# Patient Record
Sex: FEMALE | Race: WHITE | NOT HISPANIC OR LATINO | URBAN - METROPOLITAN AREA
[De-identification: names, ages, dates, MRNs, and addresses within clinical notes are randomized per-mention and may not be internally consistent; named-entity substitution may affect disease eponyms.]

---

## 2020-09-29 ENCOUNTER — LAB OUTSIDE AN ENCOUNTER (OUTPATIENT)
Dept: URBAN - METROPOLITAN AREA CLINIC 96 | Facility: CLINIC | Age: 25
End: 2020-09-29

## 2020-09-29 ENCOUNTER — WEB ENCOUNTER (OUTPATIENT)
Dept: URBAN - METROPOLITAN AREA CLINIC 96 | Facility: CLINIC | Age: 25
End: 2020-09-29

## 2020-09-29 ENCOUNTER — OFFICE VISIT (OUTPATIENT)
Dept: URBAN - METROPOLITAN AREA CLINIC 96 | Facility: CLINIC | Age: 25
End: 2020-09-29
Payer: COMMERCIAL

## 2020-09-29 DIAGNOSIS — K58.1 IRRITABLE BOWEL SYNDROME WITH CONSTIPATION: ICD-10-CM

## 2020-09-29 DIAGNOSIS — K21.0 GASTROESOPHAGEAL REFLUX DISEASE WITH ESOPHAGITIS: ICD-10-CM

## 2020-09-29 DIAGNOSIS — R14.0 BLOATING SYMPTOM: ICD-10-CM

## 2020-09-29 DIAGNOSIS — K59.00 CONSTIPATION, UNSPECIFIED CONSTIPATION TYPE: ICD-10-CM

## 2020-09-29 DIAGNOSIS — R10.31 RIGHT LOWER QUADRANT ABDOMINAL PAIN: ICD-10-CM

## 2020-09-29 DIAGNOSIS — R11.2 NON-INTRACTABLE VOMITING WITH NAUSEA, UNSPECIFIED VOMITING TYPE: ICD-10-CM

## 2020-09-29 PROCEDURE — G9903 PT SCRN TBCO ID AS NON USER: HCPCS | Performed by: INTERNAL MEDICINE

## 2020-09-29 PROCEDURE — G8420 CALC BMI NORM PARAMETERS: HCPCS | Performed by: INTERNAL MEDICINE

## 2020-09-29 PROCEDURE — 99204 OFFICE O/P NEW MOD 45 MIN: CPT | Performed by: INTERNAL MEDICINE

## 2020-09-29 PROCEDURE — G8427 DOCREV CUR MEDS BY ELIG CLIN: HCPCS | Performed by: INTERNAL MEDICINE

## 2020-09-29 RX ORDER — LINACLOTIDE 145 UG/1
1 CAPSULE AT LEAST 30 MINUTES BEFORE THE FIRST MEAL OF THE DAY ON AN EMPTY STOMACH CAPSULE, GELATIN COATED ORAL ONCE A DAY
Qty: 30 | Refills: 4 | OUTPATIENT
Start: 2020-09-29 | End: 2021-02-25

## 2020-09-29 NOTE — HPI-TODAY'S VISIT:
This is a 25-year-old female for a GI evaluation of abdominal pain and IBS.  Patient saw my partner Dr. Granger back in 2016 for multiple GI complaints including epigastric pain, bloating, gluten intolerance, constipation and GERD.  Patient told him she was constipated since the age of 14 and had tried multiple treatments including MiraLAX, Amitiza, low-dose Linzess in the past.  Linzess was too expensive for her.  She complained of severe abdominal distention and bloating and intolerance to certain foods.  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 Mcfarlane performed an upper endoscopy on March 8, 2017 and this revealed LA grade B esophagitis as well as gastric erythema and a few localized erosions in the prepyloric area of the stomach.  Biopsy showed slight chronic gastritis but no H. pylori.  Esophageal biopsy showed no abnormalities.  Pt has had chronic constipation and still has it- she takes senekot and she takes more and more. If she took nothing she would not go. Pt also over the last 5 years, she gets very gassy and bloated. Pt over the last 6 months she vomits after eating. Afraid to eat, gassy, bloated and also has a RLQ pain that is new and not better with evacuation. Denies painful or irreg periods. Pt denies depression. Pt works for [employer] in [facility]. Has been frustrated since she was young with GI issues and lack of relief.

## 2020-10-09 ENCOUNTER — WEB ENCOUNTER (OUTPATIENT)
Dept: URBAN - METROPOLITAN AREA CLINIC 124 | Facility: CLINIC | Age: 25
End: 2020-10-09

## 2020-10-13 ENCOUNTER — WEB ENCOUNTER (OUTPATIENT)
Dept: URBAN - METROPOLITAN AREA CLINIC 96 | Facility: CLINIC | Age: 25
End: 2020-10-13

## 2020-10-16 ENCOUNTER — LAB OUTSIDE AN ENCOUNTER (OUTPATIENT)
Dept: URBAN - METROPOLITAN AREA CLINIC 96 | Facility: CLINIC | Age: 25
End: 2020-10-16

## 2020-10-23 ENCOUNTER — LAB OUTSIDE AN ENCOUNTER (OUTPATIENT)
Dept: URBAN - METROPOLITAN AREA CLINIC 96 | Facility: CLINIC | Age: 25
End: 2020-10-23

## 2020-10-28 ENCOUNTER — TELEPHONE ENCOUNTER (OUTPATIENT)
Dept: URBAN - METROPOLITAN AREA CLINIC 92 | Facility: CLINIC | Age: 25
End: 2020-10-28

## 2020-10-28 ENCOUNTER — CLAIMS CREATED FROM THE CLAIM WINDOW (OUTPATIENT)
Dept: URBAN - METROPOLITAN AREA CLINIC 4 | Facility: CLINIC | Age: 25
End: 2020-10-28
Payer: COMMERCIAL

## 2020-10-28 ENCOUNTER — OFFICE VISIT (OUTPATIENT)
Dept: URBAN - METROPOLITAN AREA SURGERY CENTER 18 | Facility: SURGERY CENTER | Age: 25
End: 2020-10-28
Payer: COMMERCIAL

## 2020-10-28 DIAGNOSIS — K21.0 GASTRO-ESOPHAGEAL REFLUX DISEASE WITH ESOPHAGITIS: ICD-10-CM

## 2020-10-28 DIAGNOSIS — K31.89 OTHER DISEASES OF STOMACH AND DUODENUM: ICD-10-CM

## 2020-10-28 DIAGNOSIS — K21.9 ACID REFLUX: ICD-10-CM

## 2020-10-28 PROCEDURE — G8907 PT DOC NO EVENTS ON DISCHARG: HCPCS | Performed by: INTERNAL MEDICINE

## 2020-10-28 PROCEDURE — 88342 IMHCHEM/IMCYTCHM 1ST ANTB: CPT | Performed by: PATHOLOGY

## 2020-10-28 PROCEDURE — 88312 SPECIAL STAINS GROUP 1: CPT | Performed by: PATHOLOGY

## 2020-10-28 PROCEDURE — 88305 TISSUE EXAM BY PATHOLOGIST: CPT | Performed by: PATHOLOGY

## 2020-10-28 PROCEDURE — 43239 EGD BIOPSY SINGLE/MULTIPLE: CPT | Performed by: INTERNAL MEDICINE

## 2020-10-28 RX ORDER — PANTOPRAZOLE SODIUM 40 MG/1
1 TABLET TABLET, DELAYED RELEASE ORAL ONCE A DAY
Qty: 90 TABLET | Refills: 3 | OUTPATIENT
Start: 2020-10-28

## 2020-10-28 RX ORDER — LINACLOTIDE 145 UG/1
1 CAPSULE AT LEAST 30 MINUTES BEFORE THE FIRST MEAL OF THE DAY ON AN EMPTY STOMACH CAPSULE, GELATIN COATED ORAL ONCE A DAY
Qty: 30 | Refills: 4 | Status: ACTIVE | COMMUNITY
Start: 2020-09-29 | End: 2021-02-25

## 2020-12-17 ENCOUNTER — TELEPHONE ENCOUNTER (OUTPATIENT)
Dept: URBAN - METROPOLITAN AREA CLINIC 96 | Facility: CLINIC | Age: 25
End: 2020-12-17

## 2021-01-11 ENCOUNTER — TELEPHONE ENCOUNTER (OUTPATIENT)
Dept: URBAN - METROPOLITAN AREA CLINIC 98 | Facility: CLINIC | Age: 26
End: 2021-01-11

## 2021-01-11 RX ORDER — LINACLOTIDE 290 UG/1
1 CAPSULE AT LEAST 30 MINUTES BEFORE THE FIRST MEAL OF THE DAY ON AN EMPTY STOMACH CAPSULE, GELATIN COATED ORAL ONCE A DAY
Qty: 90 | Refills: 3 | OUTPATIENT

## 2021-02-17 ENCOUNTER — OFFICE VISIT (OUTPATIENT)
Dept: URBAN - METROPOLITAN AREA CLINIC 96 | Facility: CLINIC | Age: 26
End: 2021-02-17

## 2021-02-17 RX ORDER — LINACLOTIDE 145 UG/1
1 CAPSULE AT LEAST 30 MINUTES BEFORE THE FIRST MEAL OF THE DAY ON AN EMPTY STOMACH CAPSULE, GELATIN COATED ORAL ONCE A DAY
Qty: 30 | Refills: 4 | OUTPATIENT

## 2021-02-17 RX ORDER — LINACLOTIDE 290 UG/1
1 CAPSULE AT LEAST 30 MINUTES BEFORE THE FIRST MEAL OF THE DAY ON AN EMPTY STOMACH CAPSULE, GELATIN COATED ORAL ONCE A DAY
Qty: 90 | Refills: 3 | COMMUNITY

## 2021-02-17 RX ORDER — PANTOPRAZOLE SODIUM 40 MG/1
1 TABLET TABLET, DELAYED RELEASE ORAL ONCE A DAY
Qty: 90 TABLET | Refills: 3 | COMMUNITY
Start: 2020-10-28

## 2021-02-17 NOTE — HPI-TODAY'S VISIT:
This is a 25-year-old female for a GI follow up visit for abdominal pain and IBS.  Patient saw my partner Dr. Gutiérrez back in 2016 for multiple GI complaints including epigastric pain, bloating, gluten intolerance, constipation and GERD.  Patient told him she was constipated since the age of 14 and had tried multiple treatments including MiraLAX, Amitiza, low-dose Linzess in the past.  Linzess was too expensive for her.  She complained of severe abdominal distention and bloating and intolerance to certain foods.  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 Gutiérrez performed an upper endoscopy on March 8, 2017 and this revealed LA grade B esophagitis as well as gastric erythema and a few localized erosions in the prepyloric area of the stomach.  Biopsy showed slight chronic gastritis but no H. pylori.  Esophageal biopsy showed no abnormalities. Pt has had chronic constipation and still has it- she takes senekot and she takes more and more. If she took nothing she would not go. Pt also over the last 5 years, she gets very gassy and bloated. Pt over the last 6 months she vomits after eating. Afraid to eat, gassy, bloated and also has a RLQ pain that is new and not better with evacuation. Denies painful or irreg periods. Pt denies depression. Pt works for [employer] in [facility]. Had been frustrated since she was young with GI issues and lack of relief. I saw her in fall of 2020 and ordered GES, EGD and CT scan and pt was started on linzess and pantoprazole. Patient underwent an upper endoscopy on October 28, 2020 and this revealed LA grade a esophagitis.  Biopsies were sent and revealed no evidence of celiac, normal gastric biopsies, there was some reflux changes only on biopsy of the distal esophagus.  Patient underwent a gastric emptying scan on October 23, 2020 and this was normal, a CT scan of the abdomen pelvis was done on October 16, 2020 and this showed stool retention of the colon but no other abnormality seen her vaginal device was in place.  Patient now presents for follow-up visit since the evaluation and testing.

## 2021-04-22 ENCOUNTER — OFFICE VISIT (OUTPATIENT)
Dept: URBAN - METROPOLITAN AREA CLINIC 92 | Facility: CLINIC | Age: 26
End: 2021-04-22

## 2021-06-08 ENCOUNTER — LAB OUTSIDE AN ENCOUNTER (OUTPATIENT)
Dept: URBAN - METROPOLITAN AREA CLINIC 92 | Facility: CLINIC | Age: 26
End: 2021-06-08

## 2021-06-08 ENCOUNTER — OFFICE VISIT (OUTPATIENT)
Dept: URBAN - METROPOLITAN AREA CLINIC 92 | Facility: CLINIC | Age: 26
End: 2021-06-08
Payer: COMMERCIAL

## 2021-06-08 VITALS
HEIGHT: 62 IN | BODY MASS INDEX: 19.88 KG/M2 | HEART RATE: 83 BPM | TEMPERATURE: 97.1 F | DIASTOLIC BLOOD PRESSURE: 77 MMHG | SYSTOLIC BLOOD PRESSURE: 116 MMHG | WEIGHT: 108 LBS

## 2021-06-08 DIAGNOSIS — K21.00 CHRONIC REFLUX ESOPHAGITIS: ICD-10-CM

## 2021-06-08 DIAGNOSIS — K58.1 IRRITABLE BOWEL SYNDROME WITH CONSTIPATION: ICD-10-CM

## 2021-06-08 DIAGNOSIS — R14.0 BLOATING SYMPTOM: ICD-10-CM

## 2021-06-08 DIAGNOSIS — R11.2 NON-INTRACTABLE VOMITING WITH NAUSEA, UNSPECIFIED VOMITING TYPE: ICD-10-CM

## 2021-06-08 PROCEDURE — 91065 BREATH HYDROGEN/METHANE TEST: CPT | Performed by: INTERNAL MEDICINE

## 2021-06-08 PROCEDURE — 99204 OFFICE O/P NEW MOD 45 MIN: CPT | Performed by: INTERNAL MEDICINE

## 2021-06-08 RX ORDER — LINACLOTIDE 145 UG/1
1 CAPSULE AT LEAST 30 MINUTES BEFORE THE FIRST MEAL OF THE DAY ON AN EMPTY STOMACH CAPSULE, GELATIN COATED ORAL ONCE A DAY
Qty: 30 | Refills: 4 | OUTPATIENT

## 2021-06-08 RX ORDER — PANTOPRAZOLE SODIUM 40 MG/1
1 TABLET TABLET, DELAYED RELEASE ORAL ONCE A DAY
Qty: 90 TABLET | Refills: 3 | Status: ON HOLD | COMMUNITY
Start: 2020-10-28

## 2021-06-08 RX ORDER — LINACLOTIDE 290 UG/1
1 CAPSULE AT LEAST 30 MINUTES BEFORE THE FIRST MEAL OF THE DAY ON AN EMPTY STOMACH CAPSULE, GELATIN COATED ORAL ONCE A DAY
Qty: 90 | Refills: 3 | Status: ACTIVE | COMMUNITY

## 2021-06-08 RX ORDER — LINACLOTIDE 145 UG/1
1 CAPSULE AT LEAST 30 MINUTES BEFORE THE FIRST MEAL OF THE DAY ON AN EMPTY STOMACH CAPSULE, GELATIN COATED ORAL ONCE A DAY
Qty: 30 | Refills: 4 | Status: ON HOLD | COMMUNITY

## 2021-06-08 NOTE — HPI-TODAY'S VISIT:
This is a 25-year-old female for a GI follow up visit for abdominal pain and IBS.  Patient saw my partner Dr. Gutiérrez back in 2016 for multiple GI complaints including epigastric pain, bloating, gluten intolerance, constipation and GERD.  Patient told him she was constipated since the age of 14 and had tried multiple treatments including MiraLAX, Amitiza, low-dose Linzess in the past.  Linzess was too expensive for her.  She complained of severe abdominal distention and bloating and intolerance to certain foods.  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 Gutiérrez performed an upper endoscopy on March 8, 2017 and this revealed LA grade B esophagitis as well as gastric erythema and a few localized erosions in the prepyloric area of the stomach.  Biopsy showed slight chronic gastritis but no H. pylori.  Esophageal biopsy showed no abnormalities. Pt has had chronic constipation and still has it- she takes senekot and she takes more and more. If she took nothing she would not go. Pt also over the last 5 years, she gets very gassy and bloated. Pt over the last 6 months she vomits after eating. Afraid to eat, gassy, bloated and also has a RLQ pain that is new and not better with evacuation. Denies painful or irreg periods. Pt denies depression. Pt works for [employer] in [facility]. Had been frustrated since she was young with GI issues and lack of relief. I saw her in fall of 2020 and ordered GES, EGD and CT scan and pt was started on linzess and pantoprazole. Patient underwent an upper endoscopy on October 28, 2020 and this revealed LA grade a esophagitis.  Biopsies were sent and revealed no evidence of celiac, normal gastric biopsies, there was some reflux changes only on biopsy of the distal esophagus.  Patient underwent a gastric emptying scan on October 23, 2020 and this was normal, a CT scan of the abdomen pelvis was done on October 16, 2020 and this showed stool retention of the colon but no other abnormality seen her vaginal device was in place.  Patient now presents for follow-up visit since the evaluation and testing. Pt was tried on linzess 145ug iwth senna she finds this better than not taking it. Pt is going everyday. Pt is most bothered by bloating. Pt denies gerd  unless she is bloated. Has not tried fodmap.

## 2021-07-12 ENCOUNTER — OFFICE VISIT (OUTPATIENT)
Dept: URBAN - METROPOLITAN AREA TELEHEALTH 2 | Facility: TELEHEALTH | Age: 26
End: 2021-07-12

## 2021-10-27 ENCOUNTER — OFFICE VISIT (OUTPATIENT)
Dept: URBAN - METROPOLITAN AREA CLINIC 92 | Facility: CLINIC | Age: 26
End: 2021-10-27
Payer: COMMERCIAL

## 2021-10-27 ENCOUNTER — TELEPHONE ENCOUNTER (OUTPATIENT)
Dept: URBAN - METROPOLITAN AREA CLINIC 92 | Facility: CLINIC | Age: 26
End: 2021-10-27

## 2021-10-27 ENCOUNTER — LAB OUTSIDE AN ENCOUNTER (OUTPATIENT)
Dept: URBAN - METROPOLITAN AREA CLINIC 92 | Facility: CLINIC | Age: 26
End: 2021-10-27

## 2021-10-27 DIAGNOSIS — K58.1 IRRITABLE BOWEL SYNDROME WITH CONSTIPATION: ICD-10-CM

## 2021-10-27 DIAGNOSIS — K21.0 GASTROESOPHAGEAL REFLUX DISEASE WITH ESOPHAGITIS: ICD-10-CM

## 2021-10-27 DIAGNOSIS — K59.00 CONSTIPATION, UNSPECIFIED CONSTIPATION TYPE: ICD-10-CM

## 2021-10-27 DIAGNOSIS — R11.2 NON-INTRACTABLE VOMITING WITH NAUSEA, UNSPECIFIED VOMITING TYPE: ICD-10-CM

## 2021-10-27 DIAGNOSIS — R13.10 ODYNOPHAGIA: ICD-10-CM

## 2021-10-27 DIAGNOSIS — R14.0 BLOATING SYMPTOM: ICD-10-CM

## 2021-10-27 PROCEDURE — 99204 OFFICE O/P NEW MOD 45 MIN: CPT | Performed by: INTERNAL MEDICINE

## 2021-10-27 RX ORDER — PANTOPRAZOLE SODIUM 40 MG/1
1 TABLET TABLET, DELAYED RELEASE ORAL ONCE A DAY
Qty: 90 TABLET | Refills: 3 | Status: ON HOLD | COMMUNITY
Start: 2020-10-28

## 2021-10-27 RX ORDER — LINACLOTIDE 145 UG/1
1 CAPSULE AT LEAST 30 MINUTES BEFORE THE FIRST MEAL OF THE DAY ON AN EMPTY STOMACH CAPSULE, GELATIN COATED ORAL ONCE A DAY
Qty: 30 | Refills: 4 | Status: ACTIVE | COMMUNITY

## 2021-10-27 RX ORDER — LINACLOTIDE 290 UG/1
1 CAPSULE AT LEAST 30 MINUTES BEFORE THE FIRST MEAL OF THE DAY ON AN EMPTY STOMACH CAPSULE, GELATIN COATED ORAL ONCE A DAY
Qty: 90 | Refills: 3 | Status: ACTIVE | COMMUNITY

## 2021-10-27 RX ORDER — SUCRALFATE 1 G
1 TABLET ON AN EMPTY STOMACH TABLET ORAL TWICE A DAY
Qty: 60 | Refills: 1 | OUTPATIENT
Start: 2021-10-27 | End: 2021-12-25

## 2021-10-27 RX ORDER — PANTOPRAZOLE SODIUM 40 MG/1
1 TABLET TABLET, DELAYED RELEASE ORAL TWICE A DAY
Qty: 60 TABLET | Refills: 1 | OUTPATIENT
Start: 2021-10-27

## 2021-10-27 RX ORDER — LINACLOTIDE 145 UG/1
1 CAPSULE AT LEAST 30 MINUTES BEFORE THE FIRST MEAL OF THE DAY ON AN EMPTY STOMACH CAPSULE, GELATIN COATED ORAL ONCE A DAY
Qty: 30 | Refills: 4 | OUTPATIENT

## 2021-10-27 NOTE — HPI-TODAY'S VISIT:
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  Patient saw my partner Dr. Gutiérrez back in 2016 for multiple GI complaints including epigastric pain, bloating, gluten intolerance, constipation and GERD.  Patient told him she was constipated since the age of 14 and had tried multiple treatments including MiraLAX, Amitiza, low-dose Linzess in the past.  Linzess was too expensive for her.  She complained of severe abdominal distention and bloating and intolerance to certain foods.  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 Gutiérrez performed an upper endoscopy on March 8, 2017 and this revealed LA grade B esophagitis as well as gastric erythema and a few localized erosions in the prepyloric area of the stomach.  Biopsy showed slight chronic gastritis but no H. pylori.  Esophageal biopsy showed no abnormalities. Pt has had chronic constipation and still has it- she takes senekot and she takes more and more. If she took nothing she would not go. Pt also over the last 5 years, she gets very gassy and bloated. Pt over the last 6 months she vomits after eating. Afraid to eat, gassy, bloated and also has a RLQ pain that is new and not better with evacuation. Denies painful or irreg periods. Pt denies depression. Pt works for [employer] in [facility]. Had been frustrated since she was young with GI issues and lack of relief. I saw her in fall of 2020 and ordered GES, EGD and CT scan and pt was started on linzess and pantoprazole. Patient underwent an upper endoscopy on October 28, 2020 and this revealed LA grade a esophagitis.  Biopsies were sent and revealed no evidence of celiac, normal gastric biopsies, there was some reflux changes only on biopsy of the distal esophagus.  Patient underwent a gastric emptying scan on October 23, 2020 and this was normal, a CT scan of the abdomen pelvis was done on October 16, 2020 and this showed stool retention of the colon but no other abnormality seen her vaginal device was in place.  Patient now presents for follow-up visit since the evaluation and testing. Pt was tried on linzess 145ug iwth senna she finds this better than not taking it. Pt is going everyday. Pt is most bothered by bloating. Pt denies gerd  unless she is bloated. Has not tried fodmap. Pt has noticed her reflux is worse lately- she was traveling and eating and drinking more so thought it was dietary. Pt had regurg of acid that burned in her chest, pt had dysphagia and odynophagia. Pt had a hot searing pain in left upper quadrant. Pt was given augmentin and medrol for sinus issues. Pt uses sennosides and linzess 290ug that is working.

## 2021-12-08 ENCOUNTER — OFFICE VISIT (OUTPATIENT)
Dept: URBAN - METROPOLITAN AREA CLINIC 92 | Facility: CLINIC | Age: 26
End: 2021-12-08

## 2021-12-08 RX ORDER — LINACLOTIDE 145 UG/1
1 CAPSULE AT LEAST 30 MINUTES BEFORE THE FIRST MEAL OF THE DAY ON AN EMPTY STOMACH CAPSULE, GELATIN COATED ORAL ONCE A DAY
Qty: 30 | Refills: 4 | OUTPATIENT

## 2021-12-08 RX ORDER — PANTOPRAZOLE SODIUM 40 MG/1
1 TABLET TABLET, DELAYED RELEASE ORAL TWICE A DAY
Qty: 60 TABLET | Refills: 1 | OUTPATIENT

## 2021-12-08 RX ORDER — SUCRALFATE 1 G
1 TABLET ON AN EMPTY STOMACH TABLET ORAL TWICE A DAY
Qty: 60 | Refills: 1 | OUTPATIENT

## 2021-12-08 NOTE — HPI-TODAY'S VISIT:
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  Patient saw Dr. Gutiérrez back in 2016 for multiple GI complaints including epigastric pain, bloating, gluten intolerance, constipation and GERD.  Patient has been constipated since the age of 14 and had tried multiple treatments including MiraLAX, Amitiza, low-dose Linzess in the past.  Linzess was too expensive for her.  She complained of severe abdominal distention and bloating and intolerance to certain foods.   EGD 3/2017 revealed LA grade B esophagitis as well as gastric erythema and a few localized erosions in the prepyloric area of the stomach.  Biopsy showed slight chronic gastritis but no H. pylori.  Esophageal biopsy showed no abnormalities.  Noted post-prandial vomiting and gas, bloating as well as new RLQ pain Repeat EGD October 28, 2020 revealed LA grade a esophagitis.  Biopsies were sent and revealed no evidence of celiac, normal gastric biopsies, there was some reflux changes only on biopsy of the distal esophagus.  GES 10/2020 normal CT scan 10/2020 w stool retention of the colon but no other abnormality seen She tried linzess 290 iwth senna w/ improvement inbowels but persistent bloating and gerd flare as tlast OV after ABX for sinusitis. EGD discussed ?

## 2021-12-15 ENCOUNTER — OFFICE VISIT (OUTPATIENT)
Dept: URBAN - METROPOLITAN AREA SURGERY CENTER 18 | Facility: SURGERY CENTER | Age: 26
End: 2021-12-15

## 2022-01-24 ENCOUNTER — TELEPHONE ENCOUNTER (OUTPATIENT)
Dept: URBAN - METROPOLITAN AREA CLINIC 96 | Facility: CLINIC | Age: 27
End: 2022-01-24

## 2022-01-24 RX ORDER — LINACLOTIDE 290 UG/1
1 CAPSULE AT LEAST 30 MINUTES BEFORE THE FIRST MEAL OF THE DAY ON AN EMPTY STOMACH CAPSULE, GELATIN COATED ORAL ONCE A DAY
Qty: 90 | Refills: 3 | OUTPATIENT
Start: 2022-01-26 | End: 2023-01-21

## 2022-01-25 ENCOUNTER — TELEPHONE ENCOUNTER (OUTPATIENT)
Dept: URBAN - METROPOLITAN AREA CLINIC 92 | Facility: CLINIC | Age: 27
End: 2022-01-25

## 2022-01-25 ENCOUNTER — TELEPHONE ENCOUNTER (OUTPATIENT)
Dept: URBAN - METROPOLITAN AREA CLINIC 98 | Facility: CLINIC | Age: 27
End: 2022-01-25

## 2022-01-25 RX ORDER — PANTOPRAZOLE SODIUM 40 MG/1
1 TABLET TABLET, DELAYED RELEASE ORAL TWICE A DAY
Qty: 60 TABLET | Refills: 1

## 2022-01-25 RX ORDER — PANTOPRAZOLE SODIUM 40 MG/1
1 TABLET TABLET, DELAYED RELEASE ORAL ONCE A DAY
Qty: 90 TABLET | Refills: 3 | OUTPATIENT
Start: 2022-01-26

## 2022-01-25 RX ORDER — LINACLOTIDE 145 UG/1
1 CAPSULE AT LEAST 30 MINUTES BEFORE THE FIRST MEAL OF THE DAY ON AN EMPTY STOMACH CAPSULE, GELATIN COATED ORAL ONCE A DAY
Qty: 30 | Refills: 4
End: 2022-06-24

## 2022-01-25 RX ORDER — LINACLOTIDE 145 UG/1
1 CAPSULE AT LEAST 30 MINUTES BEFORE THE FIRST MEAL OF THE DAY ON AN EMPTY STOMACH CAPSULE, GELATIN COATED ORAL ONCE A DAY
Qty: 90 | Refills: 3 | OUTPATIENT
Start: 2022-01-26 | End: 2023-01-21

## 2022-05-12 PROBLEM — 30233002: Status: ACTIVE | Noted: 2021-10-27

## 2022-05-12 PROBLEM — 14760008: Status: ACTIVE | Noted: 2020-09-29

## 2022-05-16 ENCOUNTER — OFFICE VISIT (OUTPATIENT)
Dept: URBAN - METROPOLITAN AREA TELEHEALTH 2 | Facility: TELEHEALTH | Age: 27
End: 2022-05-16
Payer: COMMERCIAL

## 2022-05-16 DIAGNOSIS — K58.2 IRRITABLE BOWEL SYNDROME WITH BOTH CONSTIPATION AND DIARRHEA: ICD-10-CM

## 2022-05-16 DIAGNOSIS — K21.00 CHRONIC REFLUX ESOPHAGITIS: ICD-10-CM

## 2022-05-16 DIAGNOSIS — R14.0 BLOATING SYMPTOM: ICD-10-CM

## 2022-05-16 PROCEDURE — 99214 OFFICE O/P EST MOD 30 MIN: CPT | Performed by: INTERNAL MEDICINE

## 2022-05-16 RX ORDER — LINACLOTIDE 290 UG/1
1 CAPSULE AT LEAST 30 MINUTES BEFORE THE FIRST MEAL OF THE DAY ON AN EMPTY STOMACH CAPSULE, GELATIN COATED ORAL ONCE A DAY
Qty: 90 | Refills: 3 | Status: ACTIVE | COMMUNITY
Start: 2022-01-26 | End: 2023-01-21

## 2022-05-16 RX ORDER — SUCRALFATE 1 G
1 TABLET ON AN EMPTY STOMACH TABLET ORAL TWICE A DAY
Qty: 60 | Refills: 1 | Status: ACTIVE | COMMUNITY

## 2022-05-16 RX ORDER — PANTOPRAZOLE SODIUM 40 MG/1
1 TABLET TABLET, DELAYED RELEASE ORAL ONCE A DAY
Qty: 90 TABLET | Refills: 3 | Status: ACTIVE | COMMUNITY
Start: 2022-01-26

## 2022-05-16 RX ORDER — TENAPANOR HYDROCHLORIDE 53.2 MG/1
1 TABLET IMMEDIATELY BEFORE MEALS TABLET ORAL TWICE A DAY
Qty: 180 TABLET | Refills: 3 | OUTPATIENT
Start: 2022-05-16 | End: 2023-05-11

## 2022-05-16 RX ORDER — PANTOPRAZOLE SODIUM 40 MG/1
1 TABLET TABLET, DELAYED RELEASE ORAL ONCE A DAY
Qty: 90 TABLET | Refills: 3 | Status: ON HOLD | COMMUNITY
Start: 2020-10-28

## 2022-05-16 RX ORDER — RIFAXIMIN 550 MG/1
1 TABLET TABLET ORAL THREE TIMES A DAY
Qty: 42 TABLET | Refills: 0 | OUTPATIENT
Start: 2022-05-16 | End: 2022-05-30

## 2022-05-16 RX ORDER — LINACLOTIDE 145 UG/1
1 CAPSULE AT LEAST 30 MINUTES BEFORE THE FIRST MEAL OF THE DAY ON AN EMPTY STOMACH CAPSULE, GELATIN COATED ORAL ONCE A DAY
Qty: 90 | Refills: 3 | Status: ACTIVE | COMMUNITY
Start: 2022-01-26 | End: 2023-01-21

## 2022-05-16 RX ORDER — LINACLOTIDE 145 UG/1
1 CAPSULE AT LEAST 30 MINUTES BEFORE THE FIRST MEAL OF THE DAY ON AN EMPTY STOMACH CAPSULE, GELATIN COATED ORAL ONCE A DAY
Qty: 30 | Refills: 4 | Status: ACTIVE | COMMUNITY
End: 2022-06-24

## 2022-05-16 RX ORDER — PANTOPRAZOLE SODIUM 40 MG/1
1 TABLET TABLET, DELAYED RELEASE ORAL TWICE A DAY
Qty: 60 TABLET | Refills: 1 | Status: ACTIVE | COMMUNITY

## 2022-05-16 RX ORDER — LINACLOTIDE 290 UG/1
1 CAPSULE AT LEAST 30 MINUTES BEFORE THE FIRST MEAL OF THE DAY ON AN EMPTY STOMACH CAPSULE, GELATIN COATED ORAL ONCE A DAY
Qty: 90 | Refills: 3 | Status: ACTIVE | COMMUNITY

## 2022-05-16 NOTE — HPI-TODAY'S VISIT:
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       Initially seen in 2016 for multiple GI complaints including epigastric pain, bloating, gluten intolerance, constipation and GERD.  Patient has been constipated since being a toddler and had tried multiple treatments including MiraLAX, Amitiza, and linzess without improvement. Seen back in Oct with constipation, gas, bloating, RLQ pain and heartburn. Given linzess 290 and also taking senna daily and on this regimen having diarrhea every AM (3-5 times) but if she takes linzess only does not have BMs. Meds have not improved the bloating which is constant, worse with eating.   EGD 3/2017 revealed LA grade B esophagitis as well as gastric erythema and a few localized erosions in the prepyloric area of the stomach.  Biopsy showed slight chronic gastritis but no H. pylori.  Esophageal biopsy showed no abnormalities.  Repeat EGD October 28, 2020 revealed LA grade a esophagitis.  Biopsies neg for celiac and HP but there was some reflux changes only on biopsy of the distal esophagus.   She is currently on pantoprazole daily and prn carafate and her reflux was under good control unless she had too much coffee or wine but was on ABX and NSAIDs for a sinus infection and had an increase in heartburn, regurg, nausea and severe epi pain. She has been off NSAIDs and the GERD has improved. No dysphagia and nausea has resolved as has epi pain.  GES 10/2020 normal. CT scan 10/2020 w stool retention of the colon but no other abnormality seen Has not done SIBO kit

## 2022-09-27 ENCOUNTER — DASHBOARD ENCOUNTERS (OUTPATIENT)
Age: 27
End: 2022-09-27

## 2022-09-27 ENCOUNTER — OFFICE VISIT (OUTPATIENT)
Dept: URBAN - METROPOLITAN AREA TELEHEALTH 2 | Facility: TELEHEALTH | Age: 27
End: 2022-09-27
Payer: COMMERCIAL

## 2022-09-27 DIAGNOSIS — R14.0 BLOATING SYMPTOM: ICD-10-CM

## 2022-09-27 DIAGNOSIS — K21.9 ACID REFLUX: ICD-10-CM

## 2022-09-27 DIAGNOSIS — K58.0 IRRITABLE BOWEL SYNDROME WITH DIARRHEA: ICD-10-CM

## 2022-09-27 DIAGNOSIS — K59.09 CHRONIC CONSTIPATION: ICD-10-CM

## 2022-09-27 PROBLEM — 197125005: Status: ACTIVE | Noted: 2022-05-16

## 2022-09-27 PROBLEM — 266433003 GASTROESOPHAGEAL REFLUX DISEASE WITH ESOPHAGITIS: Status: ACTIVE | Noted: 2021-06-06

## 2022-09-27 PROCEDURE — 99214 OFFICE O/P EST MOD 30 MIN: CPT | Performed by: INTERNAL MEDICINE

## 2022-09-27 RX ORDER — PANTOPRAZOLE SODIUM 40 MG/1
1 TABLET TABLET, DELAYED RELEASE ORAL ONCE A DAY
Qty: 90 TABLET | Refills: 3 | Status: ON HOLD | COMMUNITY
Start: 2020-10-28

## 2022-09-27 RX ORDER — PRUCALOPRIDE 2 MG/1
1 TABLET TABLET, FILM COATED ORAL ONCE A DAY
Qty: 90 | Refills: 3 | OUTPATIENT
Start: 2022-09-27 | End: 2023-09-22

## 2022-09-27 RX ORDER — SUCRALFATE 1 G
1 TABLET ON AN EMPTY STOMACH TABLET ORAL TWICE A DAY
Qty: 60 | Refills: 1 | Status: ON HOLD | COMMUNITY

## 2022-09-27 RX ORDER — PANTOPRAZOLE SODIUM 40 MG/1
1 TABLET TABLET, DELAYED RELEASE ORAL TWICE A DAY
Qty: 60 TABLET | Refills: 1 | Status: ACTIVE | COMMUNITY

## 2022-09-27 RX ORDER — LACTOBACIL 2/BIFIDO 1/S.THERMO 450B CELL
AS DIRECTED PACKET (EA) ORAL TWICE A DAY
Qty: 60 | Refills: 12 | OUTPATIENT
Start: 2022-09-27 | End: 2023-10-22

## 2022-09-27 RX ORDER — LINACLOTIDE 290 UG/1
1 CAPSULE AT LEAST 30 MINUTES BEFORE THE FIRST MEAL OF THE DAY ON AN EMPTY STOMACH CAPSULE, GELATIN COATED ORAL ONCE A DAY
Qty: 90 | Refills: 3 | Status: ACTIVE | COMMUNITY

## 2022-09-27 NOTE — PHYSICAL EXAM HENT:
Head , normocephalic , atraumatic , Face , Face within normal limits , Ears , External ears within normal limits , Nose/Nasopharynx , External nose  normal appearance , Mouth and Throat , Oral cavity appearance normal Hgb 7.5 Hct 22.5

## 2022-09-27 NOTE — HPI-TODAY'S VISIT:
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      Initially seen in 2016 for multiple GI complaints including epigastric pain, bloating, gluten intolerance, constipation and GERD.  Patient has been constipated since being a toddler and had tried multiple treatments including MiraLAX, Amitiza, linzess, fiber without improvement. She has severe bloating, constant. She was given Xifaxin in May and notes bloating improved about about 20%. She tried IBSrela but increased urgency and abd distention w/o sign bowels so back on linzess 290 and senna and having BMs 1-2/day, mostly loose. If she just takes the linzess she wont go. Denies hematochezia, melena, anorexia or weight loss.   EGD 3/2017 revealed LA grade B esophagitis as well as gastric erythema and a few localized erosions in the prepyloric area of the stomach.  Biopsy showed slight chronic gastritis but no H. pylori.  Esophageal biopsy showed no abnormalities.  Repeat EGD October 28, 2020 revealed LA grade a esophagitis.  Biopsies neg for celiac and HP but there was some reflux changes only on biopsy of the distal esophagus. She is currently on pantoprazole daily and denies heartburn, regurg, nausea or dysphagia.   GES 10/2020 normal. CT scan 10/2020 w stool retention of the colon but no other abnormality seen Colon 10-12 years ago normal per patient.

## 2023-02-02 PROBLEM — 440630006: Status: ACTIVE | Noted: 2020-09-28

## 2023-02-10 ENCOUNTER — TELEPHONE ENCOUNTER (OUTPATIENT)
Dept: URBAN - METROPOLITAN AREA TELEHEALTH 2 | Facility: TELEHEALTH | Age: 28
End: 2023-02-10

## 2023-02-10 RX ORDER — LINACLOTIDE 290 UG/1
1 CAPSULE AT LEAST 30 MINUTES BEFORE THE FIRST MEAL OF THE DAY ON AN EMPTY STOMACH CAPSULE, GELATIN COATED ORAL ONCE A DAY
Qty: 90 | Refills: 3
End: 2024-02-05

## 2023-03-23 ENCOUNTER — OFFICE VISIT (OUTPATIENT)
Dept: URBAN - METROPOLITAN AREA MEDICAL CENTER 28 | Facility: MEDICAL CENTER | Age: 28
End: 2023-03-23